# Patient Record
Sex: MALE | Race: WHITE | NOT HISPANIC OR LATINO | Employment: OTHER | URBAN - METROPOLITAN AREA
[De-identification: names, ages, dates, MRNs, and addresses within clinical notes are randomized per-mention and may not be internally consistent; named-entity substitution may affect disease eponyms.]

---

## 2017-03-18 ENCOUNTER — HOSPITAL ENCOUNTER (EMERGENCY)
Facility: CLINIC | Age: 62
Discharge: HOME | End: 2017-03-18
Attending: FAMILY MEDICINE | Admitting: FAMILY MEDICINE
Payer: COMMERCIAL

## 2017-03-18 ENCOUNTER — APPOINTMENT (OUTPATIENT)
Dept: GENERAL RADIOLOGY | Facility: CLINIC | Age: 62
End: 2017-03-18
Attending: FAMILY MEDICINE
Payer: COMMERCIAL

## 2017-03-18 VITALS
WEIGHT: 170.9 LBS | HEIGHT: 65 IN | DIASTOLIC BLOOD PRESSURE: 83 MMHG | OXYGEN SATURATION: 99 % | SYSTOLIC BLOOD PRESSURE: 139 MMHG | RESPIRATION RATE: 20 BRPM | BODY MASS INDEX: 28.47 KG/M2 | TEMPERATURE: 97.9 F

## 2017-03-18 DIAGNOSIS — R55 SYNCOPE, UNSPECIFIED SYNCOPE TYPE: ICD-10-CM

## 2017-03-18 DIAGNOSIS — E86.0 DEHYDRATION: ICD-10-CM

## 2017-03-18 LAB
ALBUMIN SERPL-MCNC: 3.5 G/DL (ref 3.4–5)
ALP SERPL-CCNC: 68 U/L (ref 40–150)
ALT SERPL W P-5'-P-CCNC: 28 U/L (ref 0–70)
ANION GAP SERPL CALCULATED.3IONS-SCNC: 10 MMOL/L (ref 3–14)
AST SERPL W P-5'-P-CCNC: 29 U/L (ref 0–45)
BASOPHILS # BLD AUTO: 0.1 10E9/L (ref 0–0.2)
BASOPHILS NFR BLD AUTO: 0.4 %
BILIRUB SERPL-MCNC: 0.5 MG/DL (ref 0.2–1.3)
BUN SERPL-MCNC: 12 MG/DL (ref 7–30)
CALCIUM SERPL-MCNC: 8.3 MG/DL (ref 8.5–10.1)
CHLORIDE SERPL-SCNC: 107 MMOL/L (ref 94–109)
CO2 SERPL-SCNC: 29 MMOL/L (ref 20–32)
CREAT SERPL-MCNC: 1.06 MG/DL (ref 0.66–1.25)
DIFFERENTIAL METHOD BLD: ABNORMAL
EOSINOPHIL # BLD AUTO: 0.2 10E9/L (ref 0–0.7)
EOSINOPHIL NFR BLD AUTO: 2.1 %
ERYTHROCYTE [DISTWIDTH] IN BLOOD BY AUTOMATED COUNT: 15.4 % (ref 10–15)
GFR SERPL CREATININE-BSD FRML MDRD: 70 ML/MIN/1.7M2
GLUCOSE SERPL-MCNC: 112 MG/DL (ref 70–99)
HCT VFR BLD AUTO: 44.8 % (ref 40–53)
HGB BLD-MCNC: 14.4 G/DL (ref 13.3–17.7)
IMM GRANULOCYTES # BLD: 0 10E9/L (ref 0–0.4)
IMM GRANULOCYTES NFR BLD: 0.1 %
LYMPHOCYTES # BLD AUTO: 1.9 10E9/L (ref 0.8–5.3)
LYMPHOCYTES NFR BLD AUTO: 16.9 %
MCH RBC QN AUTO: 29.9 PG (ref 26.5–33)
MCHC RBC AUTO-ENTMCNC: 32.1 G/DL (ref 31.5–36.5)
MCV RBC AUTO: 93 FL (ref 78–100)
MONOCYTES # BLD AUTO: 0.8 10E9/L (ref 0–1.3)
MONOCYTES NFR BLD AUTO: 7.2 %
NEUTROPHILS # BLD AUTO: 8.3 10E9/L (ref 1.6–8.3)
NEUTROPHILS NFR BLD AUTO: 73.3 %
PLATELET # BLD AUTO: 226 10E9/L (ref 150–450)
POTASSIUM SERPL-SCNC: 3.5 MMOL/L (ref 3.4–5.3)
PROT SERPL-MCNC: 6.9 G/DL (ref 6.8–8.8)
RBC # BLD AUTO: 4.81 10E12/L (ref 4.4–5.9)
SODIUM SERPL-SCNC: 146 MMOL/L (ref 133–144)
TROPONIN I SERPL-MCNC: NORMAL UG/L (ref 0–0.04)
WBC # BLD AUTO: 11.3 10E9/L (ref 4–11)

## 2017-03-18 PROCEDURE — 25000128 H RX IP 250 OP 636: Performed by: FAMILY MEDICINE

## 2017-03-18 PROCEDURE — 71020 XR CHEST 2 VW: CPT | Mod: TC

## 2017-03-18 PROCEDURE — 84484 ASSAY OF TROPONIN QUANT: CPT | Performed by: FAMILY MEDICINE

## 2017-03-18 PROCEDURE — 93005 ELECTROCARDIOGRAM TRACING: CPT

## 2017-03-18 PROCEDURE — 85025 COMPLETE CBC W/AUTO DIFF WBC: CPT | Performed by: FAMILY MEDICINE

## 2017-03-18 PROCEDURE — 99284 EMERGENCY DEPT VISIT MOD MDM: CPT | Mod: 25

## 2017-03-18 PROCEDURE — 99284 EMERGENCY DEPT VISIT MOD MDM: CPT | Mod: 25 | Performed by: FAMILY MEDICINE

## 2017-03-18 PROCEDURE — 93010 ELECTROCARDIOGRAM REPORT: CPT | Performed by: FAMILY MEDICINE

## 2017-03-18 PROCEDURE — 96361 HYDRATE IV INFUSION ADD-ON: CPT

## 2017-03-18 PROCEDURE — 96360 HYDRATION IV INFUSION INIT: CPT

## 2017-03-18 PROCEDURE — 80053 COMPREHEN METABOLIC PANEL: CPT | Performed by: FAMILY MEDICINE

## 2017-03-18 RX ORDER — ASPIRIN 325 MG
325 TABLET ORAL DAILY
COMMUNITY

## 2017-03-18 RX ORDER — SODIUM CHLORIDE 9 MG/ML
1000 INJECTION, SOLUTION INTRAVENOUS CONTINUOUS
Status: DISCONTINUED | OUTPATIENT
Start: 2017-03-18 | End: 2017-03-18 | Stop reason: HOSPADM

## 2017-03-18 RX ORDER — LIDOCAINE 40 MG/G
CREAM TOPICAL
Status: DISCONTINUED | OUTPATIENT
Start: 2017-03-18 | End: 2017-03-18 | Stop reason: HOSPADM

## 2017-03-18 RX ADMIN — SODIUM CHLORIDE 1000 ML: 9 INJECTION, SOLUTION INTRAVENOUS at 21:16

## 2017-03-18 RX ADMIN — SODIUM CHLORIDE 1000 ML: 9 INJECTION, SOLUTION INTRAVENOUS at 20:15

## 2017-03-18 ASSESSMENT — ENCOUNTER SYMPTOMS
SHORTNESS OF BREATH: 0
LIGHT-HEADEDNESS: 1
NAUSEA: 0
DIZZINESS: 0
CONSTIPATION: 0
DIARRHEA: 0
VOMITING: 0

## 2017-03-18 NOTE — ED AVS SNAPSHOT
[facility] Emergency Department    [address] [place] [address]    Phone:  [phone]    Fax:  [phone]                                       Wolf Tarango   MRN: [id number]    Department:  [facility] Emergency Department   Date of Visit:  3/18/2017           Patient Information     Date Of Birth          1955        Your diagnoses for this visit were:     Dehydration     Syncope, unspecified syncope type orthostatic, due to dehydration       You were seen by Tyrell Rubi MD.        Discharge Instructions       Drink plenty of fluids.  If you feel dizzy or lightheaded, sit down so you do not fall.  Recheck in clinic if persistent problems.  Return to the ED if worse/concerns.  It was nice visiting with all of you this evening.  I'm glad you are feeling better.    Thank you for choosing [facility]. We appreciate the opportunity to meet your urgent medical needs. Please let us know if we could have done anything to make your stay more satisfying.    After discharge, please closely monitor for any new or worsening symptoms. Return to the Emergency Department if you develop any acute worsening signs or symptoms.    If you had lab work, cultures or imaging studies done during your stay, the final results may still be pending. We will call you if your plan of care needs to change. However, if you are not improving as expected, please follow up with your primary care provider or clinic.     Start any prescription medications that were prescribed to you and take them as directed.     Please see additional handouts that may be pertinent to your condition.        Dehydration (Adult)  Dehydration occurs when your body loses too much fluid. This may be the result of prolonged vomiting or diarrhea, excessive sweating, or a high fever. It may also happen if you don t drink enough fluid when you re sick or out in the heat. Misuse of diuretics (water pills)  can also be a cause.  Symptoms include thirst and decreased urine output. You may also feel dizzy, weak, fatigued, or very drowsy. The diet described below is usually enough to treat dehydration. In some cases, you may need medicine.  Home care    Drink at least 12 8-ounce glasses of fluid every day to resolve the dehydration. Fluid may include water; orange juice; lemonade; apple, grape, or cranberry juice; clear fruit drinks; electrolyte replacement and sports drinks; and teas and coffee without caffeine. If you have been diagnosed with a kidney disease, ask your doctor how much and what types of fluids you should drink to prevent dehydration. If you have kidney disease, fluid can build up in the body. This can be dangerous to your health.     If you have a fever, muscle aches, or a headache as a result of a cold or flu, you may take acetaminophen or ibuprofen, unless another medicine was prescribed. If you have chronic liver or kidney disease, or have ever had a stomach ulcer or gastrointestinal bleeding, talk with your health care provider before using these medicines. Don't take aspirin if you are younger than 18 and have a fever. Aspirin raises the chance for severe liver injury.  Follow-up care  Follow up with your health care provider, or as advised.  When to seek medical advice  Call your health care provider right away if any of these occur:    Continued vomiting    Frequent diarrhea (more than 5 times a day); blood (red or black color) or mucus in diarrhea    Blood in vomit or stool    Swollen abdomen or increasing abdominal pain    Weakness, dizziness, or fainting    Unusual drowsiness or confusion    Reduced urine output or extreme thirst    Fever of 100.4 F (34 C) or higher     0661-1253 [employer]. [address] [place] [address], [place] [address]. All rights reserved. This information is not intended as a substitute for professional medical care. Always follow your healthcare professional's  "instructions.          24 Hour Appointment Hotline       To make an appointment at any [facility], call [phone] ([phone]). If you don't have a family doctor or clinic, we will help you find one. [facility] clinics are conveniently located to serve the needs of you and your family.             Review of your medicines      Our records show that you are taking the medicines listed below. If these are incorrect, please call your family doctor or clinic.        Dose / Directions Last dose taken    ADVIL PO        Refills:  0        aspirin 325 MG tablet   Dose:  325 mg        Take 325 mg by mouth daily   Refills:  0        NICORETTE 2 MG gum   Dose:  2 mg   Generic drug:  nicotine polacrilex        Place 2 mg inside cheek as needed for smoking cessation   Refills:  0                Procedures and tests performed during your visit     CBC with platelets differential    Cardiac Continuous Monitoring    Comprehensive metabolic panel    EKG 12-lead, tracing only    Peripheral IV: Standard    Pulse oximetry nursing    Troponin I    XR Chest 2 Views      Orders Needing Specimen Collection     None      Pending Results     No orders found from 3/16/2017 to 3/19/2017.            Pending Culture Results     No orders found from 3/16/2017 to 3/19/2017.            Thank you for choosing [facility]       Thank you for choosing [facility] for your care. Our goal is always to provide you with excellent care. Hearing back from our patients is one way we can continue to improve our services. Please take a few minutes to complete the written survey that you may receive in the mail after you visit with us. Thank you!        [employer] Information     [employer] lets you send messages to your doctor, view your test results, renew your prescriptions, schedule appointments and more. To sign up, go to [email or web address] . Click on \"Log in\" on the left side of the screen, which will take you to the Welcome page. Then click on " "\"Sign up Now\" on the right side of the page.     You will be asked to enter the access code listed below, as well as some personal information. Please follow the directions to create your username and password.     Your access code is: [email or web address]  Expires: 2017  9:39 PM     Your access code will  in 90 days. If you need help or a new code, please call your [place] clinic or [phone].        Care EveryWhere ID     This is your Care EveryWhere ID. This could be used by other organizations to access your [place] medical records  [id number]        After Visit Summary       This is your record. Keep this with you and show to your community pharmacist(s) and doctor(s) at your next visit.                  "

## 2017-03-18 NOTE — ED AVS SNAPSHOT
[facility] Emergency Department    [address] [place] [address]    Phone:  [phone]    Fax:  [phone]                                       Wolf Tarango   MRN: [id number]    Department:  [facility] Emergency Department   Date of Visit:  3/18/2017           After Visit Summary Signature Page     I have received my discharge instructions, and my questions have been answered. I have discussed any challenges I see with this plan with the nurse or doctor.    ..........................................................................................................................................  Patient/Patient Representative Signature      ..........................................................................................................................................  Patient Representative Print Name and Relationship to Patient    ..................................................               ................................................  Date                                            Time    ..........................................................................................................................................  Reviewed by Signature/Title    ...................................................              ..............................................  Date                                                            Time

## 2017-03-19 NOTE — DISCHARGE INSTRUCTIONS
Drink plenty of fluids.  If you feel dizzy or lightheaded, sit down so you do not fall.  Recheck in clinic if persistent problems.  Return to the ED if worse/concerns.  It was nice visiting with all of you this evening.  I'm glad you are feeling better.    Thank you for choosing [facility]. We appreciate the opportunity to meet your urgent medical needs. Please let us know if we could have done anything to make your stay more satisfying.    After discharge, please closely monitor for any new or worsening symptoms. Return to the Emergency Department if you develop any acute worsening signs or symptoms.    If you had lab work, cultures or imaging studies done during your stay, the final results may still be pending. We will call you if your plan of care needs to change. However, if you are not improving as expected, please follow up with your primary care provider or clinic.     Start any prescription medications that were prescribed to you and take them as directed.     Please see additional handouts that may be pertinent to your condition.        Dehydration (Adult)  Dehydration occurs when your body loses too much fluid. This may be the result of prolonged vomiting or diarrhea, excessive sweating, or a high fever. It may also happen if you don t drink enough fluid when you re sick or out in the heat. Misuse of diuretics (water pills) can also be a cause.  Symptoms include thirst and decreased urine output. You may also feel dizzy, weak, fatigued, or very drowsy. The diet described below is usually enough to treat dehydration. In some cases, you may need medicine.  Home care    Drink at least 12 8-ounce glasses of fluid every day to resolve the dehydration. Fluid may include water; orange juice; lemonade; apple, grape, or cranberry juice; clear fruit drinks; electrolyte replacement and sports drinks; and teas and coffee without caffeine. If you have been diagnosed with a kidney disease, ask your  doctor how much and what types of fluids you should drink to prevent dehydration. If you have kidney disease, fluid can build up in the body. This can be dangerous to your health.     If you have a fever, muscle aches, or a headache as a result of a cold or flu, you may take acetaminophen or ibuprofen, unless another medicine was prescribed. If you have chronic liver or kidney disease, or have ever had a stomach ulcer or gastrointestinal bleeding, talk with your health care provider before using these medicines. Don't take aspirin if you are younger than 18 and have a fever. Aspirin raises the chance for severe liver injury.  Follow-up care  Follow up with your health care provider, or as advised.  When to seek medical advice  Call your health care provider right away if any of these occur:    Continued vomiting    Frequent diarrhea (more than 5 times a day); blood (red or black color) or mucus in diarrhea    Blood in vomit or stool    Swollen abdomen or increasing abdominal pain    Weakness, dizziness, or fainting    Unusual drowsiness or confusion    Reduced urine output or extreme thirst    Fever of 100.4 F (34 C) or higher     4498-1421 [employer]. [address] [place] [address]. All rights reserved. This information is not intended as a substitute for professional medical care. Always follow your healthcare professional's instructions.

## 2017-03-19 NOTE — ED NOTES
Pt fell in kitchen tonight unwitnessed but heard by family in the other room. LOC for approx 1min or so

## 2017-03-19 NOTE — ED PROVIDER NOTES
History     Chief Complaint   Patient presents with     Loss of Consciousness     The history is provided by the patient and a relative.     Wolf Tarango is a 61 year old male who presents to the ED with his two boys for loss of consciousness. Patient was in the kitchen when his family heard a fall from the living room. Children report that he was LOC for approximately 1 minute. He reports that he did feel a little lightheaded before the incident. He then went and sat down in the chair and then got back up, and went to the kitchen and that's when he woke up to his son's trying to wake him up.  He was in the process of making spaghetti and fortunately did not make it to the stove to handle the boiling pot of water before he fell.    Denies chest heaviness, shortness of breath, nausea or a headache. His kids state that he didn't drink much water today and smoked some marijuana with them. There is a family history of heart problems.       There is no problem list on file for this patient.    History reviewed. No pertinent past medical history.    History reviewed. No pertinent past surgical history.    No family history on file.    Social History   Substance Use Topics     Smoking status: Former Smoker     Smokeless tobacco: Current User     Alcohol use No          There is no immunization history on file for this patient.     No Known Allergies    Current Outpatient Prescriptions   Medication Sig Dispense Refill     Ibuprofen (ADVIL PO)        aspirin 325 MG tablet Take 325 mg by mouth daily       nicotine polacrilex (NICORETTE) 2 MG gum Place 2 mg inside cheek as needed for smoking cessation           I have reviewed the Medications, Allergies, Past Medical and Surgical History, and Social History in the Epic system.    Review of Systems   Respiratory: Negative for shortness of breath.    Gastrointestinal: Negative for constipation, diarrhea, nausea and vomiting.   Skin: Negative for rash.   Neurological:  "Positive for light-headedness. Negative for dizziness.   All other systems reviewed and are negative.      Physical Exam   BP: 127/75  Heart Rate: 73  Temp: 97.9  F (36.6  C)  Resp: 20  Height: 165.1 cm (5' 5\")  Weight: 77.5 kg (170 lb 14.4 oz)  SpO2: 98 %  Physical Exam   Constitutional: He is oriented to person, place, and time. He appears well-developed and well-nourished. No distress.   HENT:   Head: Normocephalic and atraumatic.   Mouth/Throat: Oropharynx is clear and moist.   Eyes: EOM are normal. Pupils are equal, round, and reactive to light.   Neck: Neck supple.   Cardiovascular: Normal rate, regular rhythm and normal heart sounds.    Pulmonary/Chest: Effort normal and breath sounds normal. No respiratory distress.   Abdominal: Soft. Bowel sounds are normal. There is no tenderness.   Musculoskeletal: Normal range of motion. He exhibits no edema, tenderness or deformity.   Neurological: He is alert and oriented to person, place, and time. He has normal strength. No cranial nerve deficit or sensory deficit. He displays a negative Romberg sign. Coordination (FNF is normal) normal. GCS eye subscore is 4. GCS verbal subscore is 5. GCS motor subscore is 6.   Skin: Skin is warm and dry.   Psychiatric: He has a normal mood and affect.       ED Course    EKG was normal.  He maintained in a sinus rhythm on the cardiac monitor.  Labs are unremarkable.  Troponin undetectable.  No signs of trauma.  He's feeling much improved after a liter of normal saline.  He feels ready to go home.  Suspect he is a bit on the dry side because of inadequate oral intake.  He readily admits to not drinking or eating much today or even yesterday but was in the process of making spaghetti.  Did smoke some marijuana with his sons earlier today and has been using Nicorette gum while continuing to chew tobacco.           ED Course     Procedures        EKG: Sinus rhythm at 79 BPM.  No acute ischemic changes.  Normal EKG.        Results for " orders placed or performed during the hospital encounter of 03/18/17 (from the past 24 hour(s))   CBC with platelets differential   Result Value Ref Range    WBC 11.3 (H) 4.0 - 11.0 10e9/L    RBC Count 4.81 4.4 - 5.9 10e12/L    Hemoglobin 14.4 13.3 - 17.7 g/dL    Hematocrit 44.8 40.0 - 53.0 %    MCV 93 78 - 100 fl    MCH 29.9 26.5 - 33.0 pg    MCHC 32.1 31.5 - 36.5 g/dL    RDW 15.4 (H) 10.0 - 15.0 %    Platelet Count 226 150 - 450 10e9/L    Diff Method Automated Method     % Neutrophils 73.3 %    % Lymphocytes 16.9 %    % Monocytes 7.2 %    % Eosinophils 2.1 %    % Basophils 0.4 %    % Immature Granulocytes 0.1 %    Absolute Neutrophil 8.3 1.6 - 8.3 10e9/L    Absolute Lymphocytes 1.9 0.8 - 5.3 10e9/L    Absolute Monocytes 0.8 0.0 - 1.3 10e9/L    Absolute Eosinophils 0.2 0.0 - 0.7 10e9/L    Absolute Basophils 0.1 0.0 - 0.2 10e9/L    Abs Immature Granulocytes 0.0 0 - 0.4 10e9/L   Troponin I   Result Value Ref Range    Troponin I ES  0.000 - 0.045 ug/L     <0.015  The 99th percentile for upper reference range is 0.045 ug/L.  Troponin values in   the range of 0.045 - 0.120 ug/L may be associated with risks of adverse   clinical events.     Comprehensive metabolic panel   Result Value Ref Range    Sodium 146 (H) 133 - 144 mmol/L    Potassium 3.5 3.4 - 5.3 mmol/L    Chloride 107 94 - 109 mmol/L    Carbon Dioxide 29 20 - 32 mmol/L    Anion Gap 10 3 - 14 mmol/L    Glucose 112 (H) 70 - 99 mg/dL    Urea Nitrogen 12 7 - 30 mg/dL    Creatinine 1.06 0.66 - 1.25 mg/dL    GFR Estimate 70 >60 mL/min/1.7m2    GFR Estimate If Black 85 >60 mL/min/1.7m2    Calcium 8.3 (L) 8.5 - 10.1 mg/dL    Bilirubin Total 0.5 0.2 - 1.3 mg/dL    Albumin 3.5 3.4 - 5.0 g/dL    Protein Total 6.9 6.8 - 8.8 g/dL    Alkaline Phosphatase 68 40 - 150 U/L    ALT 28 0 - 70 U/L    AST 29 0 - 45 U/L   XR Chest 2 Views    Narrative    XR CHEST 2 VW   3/18/2017 7:51 PM     HISTORY: Chest Pain    COMPARISON: None.    FINDINGS: The heart is negative.  The lungs are  clear. The pulmonary  vasculature is normal.  The bones and soft tissues are unremarkable.      Impression    IMPRESSION: No active infiltrate.        KD BOWLES MD       Medications   lidocaine 1 % 1 mL (not administered)   lidocaine (LMX4) kit (not administered)   sodium chloride (PF) 0.9% PF flush 3 mL (not administered)   sodium chloride (PF) 0.9% PF flush 3 mL (not administered)   0.9% sodium chloride BOLUS (0 mLs Intravenous Stopped 3/18/17 2117)     Followed by   0.9% sodium chloride infusion (1,000 mLs Intravenous New Bag 3/18/17 2116)     Assessments & Plan (with Medical Decision Making)    (E86.0) Dehydration  Comment: Due to inadequate oral intake.  Improved after IV fluids  Plan: Continue oral hydration.    (R55) Syncope, unspecified syncope type  Comment: orthostatic, due to dehydration  Plan: Recheck if recurrent problems.  Maintain adequate hydration.        I have reviewed the nursing notes.    I have reviewed the findings, diagnosis, plan and need for follow up with the patient.    New Prescriptions    No medications on file       Final diagnoses:   Dehydration   Syncope, unspecified syncope type - orthostatic, due to dehydration     This document serves as a record of services personally performed by Tyrell Rubi MD. It was created on their behalf by Odette Marcelo, a trained medical scribe. The creation of this record is based on the provider's personal observations and the statements of the patient. This document has been checked and approved by the attending provider.    Note: Chart documentation done in part with Dragon Voice Recognition software. Although reviewed after completion, some word and grammatical errors may remain.      3/18/2017   [facility] EMERGENCY DEPARTMENT     Tyrell Rubi MD  03/18/17 [id number]

## 2022-04-01 ENCOUNTER — TRANSCRIBE ORDERS (OUTPATIENT)
Dept: OTHER | Age: 67
End: 2022-04-01

## 2022-04-05 ENCOUNTER — TRANSCRIBE ORDERS (OUTPATIENT)
Dept: OTHER | Age: 67
End: 2022-04-05

## 2022-04-05 DIAGNOSIS — R19.5 OTHER FECAL ABNORMALITIES: Primary | ICD-10-CM

## 2022-04-15 ENCOUNTER — TELEPHONE (OUTPATIENT)
Dept: GASTROENTEROLOGY | Facility: CLINIC | Age: 67
End: 2022-04-15
Payer: MEDICARE

## 2022-04-15 NOTE — TELEPHONE ENCOUNTER
Screening Questions  BlueKIND OF PREP RedLOCATION [review exclusion criteria] GreenSEDATION TYPE  1. Have you had a positive covid test in the last 90 days? N     2. Do you have a legal guardian or medical Power of ?  Are you able to give consent for your medical care?Y (Sedation review/consideration needed)    3. Are you active on mychart? N    4. What insurance is in the chart? [facility] [employer]/medicare      3.   Ordering/Referring Provider: [facility] Antoni Ritter John, PA-C in GENERIC EXTERNAL DATA DEPARTMENT    4. BMI 28.3 [BMI OVER 40-EXTENDED PREP]  If greater than 40 review exclusion criteria [PAC APPT IF @ [facility]]        5.  Respiratory Screening :  [If yes to any of the following HOSPITAL setting only]     Do you use daily home oxygen? N    Do you have mod to severe Obstructive Sleep Apnea? N  [OKAY @ [facility] SH PH RI]   Do you have Pulmonary Hypertension? N     Do you have UNCONTROLLED asthma? N        6.   Have you had a heart or lung transplant? N      7.   Are you currently on dialysis? N [ If yes, G-PREP & HOSPITAL setting only]     8.   Do you have chronic kidney disease? N [ If yes, G-PREP ]    9.   Have you had a stroke or Transient ischemic attack (TIA - aka  mini stroke ) within 6 months?  N (If yes, please review exclusion criteria)    10.   In the past 6 months, have you had any heart related issues including cardiomyopathy or heart attack? N           If yes, did it require cardiac stenting or other implantable device? N      11.   Do you have any implantable devices in your body (pacemaker, defib, LVAD)? N (If yes, please review exclusion criteria)    12.   Do you take nitroglycerin? N           If yes, how often? N  (if yes, HOSPITAL setting ONLY)    13.   Are you currently taking any blood thinners? N           [IF YES, INFORM PATIENT TO FOLLOW UP W/ ORDERING PROVIDER FOR BRIDGING INSTRUCTIONS]     14.   Do you have a diagnosis of diabetes? N   [ If yes, G-PREP ]    15.   [FEMALES]  Are you currently pregnant?     If yes, how many weeks?     16.   Are you taking any prescription pain medications on a routine schedule?  N  [ If yes, EXTENDED PREP.] [If yes, MAC]    17.   Do you have any chemical dependencies such as alcohol, street drugs, or methadone?  N [If yes, MAC]    18.   Do you have any history of post-traumatic stress syndrome, severe anxiety or history of psychosis?  N  [If yes, MAC]    19.   Do you transfer independently?  Y    20.  On a regular basis do you go 3-5 days between bowel movements? N   [ If yes, EXTENDED PREP.]    21.   Preferred LOCAL Pharmacy for Pre Prescription   [facility]      Scheduling Details      Caller : Wolf  (Please ask for phone number if not scheduled by patient)    Type of Procedure Scheduled: colonoscopy  Which Colonoscopy Prep was Sent?: miralax  KHORUTS CF PATIENTS & GROEN'S PATIENTS NEEDS EXTENDED PREP  Surgeon: osmany  Date of Procedure: 5/24  Location:       Sedation Type: CS  Conscious Sedation- Needs  for 6 hours after the procedure  MAC/General-Needs  for 24 hours after procedure    Pre-op Required at [facility], [place], [place] and OR for MAC sedation: n  (advise patient they will need a pre-op prior to procedure -)      Informed patient they will need an adult  y  Cannot take any type of public or medical transportation alone    Pre-Procedure [facility]id test to [facility] or Externally:     Confirmed Nurse will call to complete assessment y    Additional comments: none

## 2022-04-19 DIAGNOSIS — Z11.59 ENCOUNTER FOR SCREENING FOR OTHER VIRAL DISEASES: Primary | ICD-10-CM

## 2022-05-21 ENCOUNTER — LAB (OUTPATIENT)
Dept: LAB | Facility: CLINIC | Age: 67
End: 2022-05-21
Payer: MEDICARE

## 2022-05-21 DIAGNOSIS — Z11.59 ENCOUNTER FOR SCREENING FOR OTHER VIRAL DISEASES: ICD-10-CM

## 2022-05-21 PROCEDURE — U0003 INFECTIOUS AGENT DETECTION BY NUCLEIC ACID (DNA OR RNA); SEVERE ACUTE RESPIRATORY SYNDROME CORONAVIRUS 2 (SARS-COV-2) (CORONAVIRUS DISEASE [COVID-19]), AMPLIFIED PROBE TECHNIQUE, MAKING USE OF HIGH THROUGHPUT TECHNOLOGIES AS DESCRIBED BY CMS-2020-01-R: HCPCS

## 2022-05-21 PROCEDURE — U0005 INFEC AGEN DETEC AMPLI PROBE: HCPCS

## 2022-05-22 LAB — SARS-COV-2 RNA RESP QL NAA+PROBE: NEGATIVE

## 2022-05-23 ENCOUNTER — ANESTHESIA EVENT (OUTPATIENT)
Dept: GASTROENTEROLOGY | Facility: CLINIC | Age: 67
End: 2022-05-23
Payer: COMMERCIAL

## 2022-05-23 NOTE — H&P
[facility] History and Physical    Wolf Tarango MRN# [id number]   Age: 67 year old YOB: 1955     Date of Admission:  (Not on file)    [facility]  Primary care provider: [facility]          Impression and Plan:   Impression:   Other fecal abnormalities [R19.5]  Abnormal FIT  No prior colonoscopy at all.  No prior colonoscopy in system      Plan:   Proceed to Colonoscopy as planned.  The procedure, risks(bleeding, perforation), benefits and alternatives were discussed and the patient agrees to proceed. Cleared for Anesthesia             Chief Complaint:   Other fecal abnormalities [R19.5]    History is obtained from the patient          History of Present Illness:   This 67 year old male is being seen at this time for evaluation for colonoscopy.  No complaints or family history.  ? Abnormal FIT.           Past Medical History:   No past medical history on file.         Past Surgical History:   No past surgical history on file.         Social History:     Social History     Tobacco Use     Smoking status: Former Smoker     Smokeless tobacco: Current User   Substance Use Topics     Alcohol use: No            Family History:   No family history on file.         Immunizations:     VACCINE/DOSE   Diptheria   DPT   DTAP   HBIG   Hepatitis A   Hepatitis B   HIB   Influenza   Measles   Meningococcal   MMR   Mumps   Pneumococcal   Polio   Rubella   Small Pox   TDAP   Varicella   Zoster            Allergies:   No Known Allergies         Medications:     No current facility-administered medications for this encounter.     Current Outpatient Medications   Medication Sig     aspirin 325 MG tablet Take 325 mg by mouth daily     Ibuprofen (ADVIL PO)      nicotine polacrilex (NICORETTE) 2 MG gum Place 2 mg inside cheek as needed for smoking cessation             Review of Systems:   The review of systems was positive for the following findings.  None.  The remainder of the review of  systems was unremarkable.          Physical Exam:   All vitals have been reviewed  There were no vitals taken for this visit.  No intake or output data in the 24 hours ending 05/23/22 0958  SHEENT examination revealed NC/aT, EOMI.  Examination of the chest revealed CTA.  Examination of the heart revealed RRR.  Examination of the abdomen revealed soft, non tender.  The neuromuscular examination was NL.          Data:   All laboratory data reviewed  No results found for any visits on 05/24/22.       Wolf Schrader MD, FACS

## 2022-05-24 ENCOUNTER — HOSPITAL ENCOUNTER (OUTPATIENT)
Facility: CLINIC | Age: 67
Discharge: HOME | End: 2022-05-24
Attending: SPECIALIST | Admitting: SPECIALIST
Payer: COMMERCIAL

## 2022-05-24 ENCOUNTER — ANESTHESIA (OUTPATIENT)
Dept: GASTROENTEROLOGY | Facility: CLINIC | Age: 67
End: 2022-05-24
Payer: COMMERCIAL

## 2022-05-24 VITALS
DIASTOLIC BLOOD PRESSURE: 93 MMHG | SYSTOLIC BLOOD PRESSURE: 141 MMHG | HEART RATE: 74 BPM | OXYGEN SATURATION: 97 % | RESPIRATION RATE: 16 BRPM

## 2022-05-24 LAB — COLONOSCOPY: NORMAL

## 2022-05-24 PROCEDURE — 370N000017 HC ANESTHESIA TECHNICAL FEE, PER MIN: Performed by: SPECIALIST

## 2022-05-24 PROCEDURE — 258N000003 HC RX IP 258 OP 636: Performed by: NURSE ANESTHETIST, CERTIFIED REGISTERED

## 2022-05-24 PROCEDURE — 45378 DIAGNOSTIC COLONOSCOPY: CPT | Performed by: SPECIALIST

## 2022-05-24 PROCEDURE — 250N000011 HC RX IP 250 OP 636: Performed by: NURSE ANESTHETIST, CERTIFIED REGISTERED

## 2022-05-24 PROCEDURE — 250N000009 HC RX 250: Performed by: NURSE ANESTHETIST, CERTIFIED REGISTERED

## 2022-05-24 RX ORDER — LIDOCAINE 40 MG/G
CREAM TOPICAL
Status: DISCONTINUED | OUTPATIENT
Start: 2022-05-24 | End: 2022-05-24 | Stop reason: HOSPADM

## 2022-05-24 RX ORDER — PROPOFOL 10 MG/ML
INJECTION, EMULSION INTRAVENOUS PRN
Status: DISCONTINUED | OUTPATIENT
Start: 2022-05-24 | End: 2022-05-24

## 2022-05-24 RX ORDER — LIDOCAINE HYDROCHLORIDE 20 MG/ML
INJECTION, SOLUTION INFILTRATION; PERINEURAL PRN
Status: DISCONTINUED | OUTPATIENT
Start: 2022-05-24 | End: 2022-05-24

## 2022-05-24 RX ORDER — SODIUM CHLORIDE, SODIUM LACTATE, POTASSIUM CHLORIDE, CALCIUM CHLORIDE 600; 310; 30; 20 MG/100ML; MG/100ML; MG/100ML; MG/100ML
INJECTION, SOLUTION INTRAVENOUS CONTINUOUS
Status: DISCONTINUED | OUTPATIENT
Start: 2022-05-24 | End: 2022-05-24 | Stop reason: HOSPADM

## 2022-05-24 RX ORDER — PROPOFOL 10 MG/ML
INJECTION, EMULSION INTRAVENOUS CONTINUOUS PRN
Status: DISCONTINUED | OUTPATIENT
Start: 2022-05-24 | End: 2022-05-24

## 2022-05-24 RX ADMIN — PROPOFOL 150 MCG/KG/MIN: 10 INJECTION, EMULSION INTRAVENOUS at 15:51

## 2022-05-24 RX ADMIN — SODIUM CHLORIDE, POTASSIUM CHLORIDE, SODIUM LACTATE AND CALCIUM CHLORIDE: 600; 310; 30; 20 INJECTION, SOLUTION INTRAVENOUS at 13:50

## 2022-05-24 RX ADMIN — LIDOCAINE HYDROCHLORIDE 40 MG: 20 INJECTION, SOLUTION INFILTRATION; PERINEURAL at 15:50

## 2022-05-24 RX ADMIN — PROPOFOL 30 MG: 10 INJECTION, EMULSION INTRAVENOUS at 15:52

## 2022-05-24 RX ADMIN — PROPOFOL 50 MG: 10 INJECTION, EMULSION INTRAVENOUS at 15:50

## 2022-05-24 ASSESSMENT — LIFESTYLE VARIABLES: TOBACCO_USE: 1

## 2022-05-24 NOTE — ANESTHESIA PREPROCEDURE EVALUATION
Anesthesia Pre-Procedure Evaluation    Patient: Wolf Tarango   MRN: [id number] : 1955        Procedure : Procedure(s):  COLONOSCOPY          History reviewed. No pertinent past medical history.   History reviewed. No pertinent surgical history.   No Known Allergies   Social History     Tobacco Use     Smoking status: Former Smoker     Smokeless tobacco: Current User   Substance Use Topics     Alcohol use: No      Wt Readings from Last 1 Encounters:   17 77.5 kg (170 lb 14.4 oz)        Anesthesia Evaluation   Pt has had prior anesthetic. Type: MAC and General.    No history of anesthetic complications       ROS/MED HX  ENT/Pulmonary:     (+) tobacco use, Past use,     Neurologic:  - neg neurologic ROS     Cardiovascular:  - neg cardiovascular ROS   (+) -----Previous cardiac testing   Echo: Date: Results:    Stress Test: Date: Results:    ECG Reviewed: Date:  Results:  SR  Cath: Date: Results:      METS/Exercise Tolerance: >4 METS    Hematologic:  - neg hematologic  ROS     Musculoskeletal:  - neg musculoskeletal ROS     GI/Hepatic:  - neg GI/hepatic ROS     Renal/Genitourinary:  - neg Renal ROS     Endo:  - neg endo ROS     Psychiatric/Substance Use:     (+) Recreational drug usage: Cannabis.    Infectious Disease:  - neg infectious disease ROS     Malignancy:  - neg malignancy ROS     Other:  - neg other ROS          Physical Exam    Airway        Mallampati: III   TM distance: > 3 FB   Neck ROM: full   Mouth opening: > 3 cm    Respiratory Devices and Support         Dental       (+) missing      Cardiovascular   cardiovascular exam normal       Rhythm and rate: regular and normal     Pulmonary   pulmonary exam normal        breath sounds clear to auscultation           OUTSIDE LABS:  CBC:   Lab Results   Component Value Date    WBC 11.3 (H) 2017    HGB 14.4 2017    HCT 44.8 2017     2017     BMP:   Lab Results   Component Value Date     (H) 2017     POTASSIUM 3.5 03/18/2017    CHLORIDE 107 03/18/2017    CO2 29 03/18/2017    BUN 12 03/18/2017    CR 1.06 03/18/2017     (H) 03/18/2017     COAGS: No results found for: PTT, INR, FIBR  POC: No results found for: BGM, HCG, HCGS  HEPATIC:   Lab Results   Component Value Date    ALBUMIN 3.5 03/18/2017    PROTTOTAL 6.9 03/18/2017    ALT 28 03/18/2017    AST 29 03/18/2017    ALKPHOS 68 03/18/2017    BILITOTAL 0.5 03/18/2017     OTHER:   Lab Results   Component Value Date    PRAMOD 8.3 (L) 03/18/2017       Anesthesia Plan    ASA Status:  2   NPO Status:  NPO Appropriate    Anesthesia Type: MAC.     - Reason for MAC: Deep or markedly invasive procedure (G8)   Induction: Intravenous.   Maintenance: TIVA.        Consents    Anesthesia Plan(s) and associated risks, benefits, and realistic alternatives discussed. Questions answered and patient/representative(s) expressed understanding.    - Discussed:     - Discussed with:  Patient      - Extended Intubation/Ventilatory Support Discussed: No.      - Patient is DNR/DNI Status: No    Use of blood products discussed: Yes.     - Discussed with: Patient.     - Consented: consented to blood products            Reason for refusal: other.     Postoperative Care    Pain management: Multi-modal analgesia.   PONV prophylaxis: Background Propofol Infusion     Comments:    Other Comments: The risks and benefits of anesthesia, and the alternatives where applicable, have been discussed with the patient, and they wish to proceed.            CHRISTY Cameron CRNA

## 2022-05-24 NOTE — ANESTHESIA POSTPROCEDURE EVALUATION
Patient: Wolf Tarango    Procedure: Procedure(s):  COLONOSCOPY       Anesthesia Type:  MAC    Note:  Disposition: Outpatient   Postop Pain Control: Uneventful            Sign Out: Well controlled pain   PONV: No   Neuro/Psych: Uneventful            Sign Out: Acceptable/Baseline neuro status   Airway/Respiratory: Uneventful            Sign Out: Acceptable/Baseline resp. status   CV/Hemodynamics: Uneventful            Sign Out: Acceptable CV status   Other NRE: NONE   DID A NON-ROUTINE EVENT OCCUR? No    Event details/Postop Comments:  Pt was happy with anesthesia care.  No complications.  I will follow up with the pt if needed.           Last vitals:  Vitals Value Taken Time   /84 05/24/22 1610   Temp     Pulse 83 05/24/22 1610   Resp     SpO2 97 % 05/24/22 1617   Vitals shown include unvalidated device data.    Electronically Signed By: CHRISTY Vleazco CRNA  May 24, 2022  4:18 PM

## 2022-05-24 NOTE — DISCHARGE INSTRUCTIONS
[facility]    Home Care Following Endoscopy          Activity:  You have just undergone an endoscopic procedure usually performed with conscious sedation.  Do not work or operate machinery (including a car) for at least 12 hours.    I encourage you to walk and attempt to pass this air as soon as possible.    Diet:  Return to the diet you were on before your procedure but eat lightly for the first 12-24 hours.  Drink plenty of water.  Resume any regular medications unless otherwise advised by your physician.  Please begin any new medication prescribed as a result of your procedure as directed by your physician.   If you had any biopsy or polyp removed please refrain from aspirin or aspirin products for 2 days.  If on Coumadin please restart as instructed by your physician.   Pain:  You may take Tylenol as needed for pain.  Expected Recovery:  You can expect some mild abdominal fullness and/or discomfort due to the air used to inflate your intestinal tract.     Call Your Physician if You Have:  After Colonoscopy:  Worsening persisting abdominal pain which is worse with activity.  Fevers (>101 degrees F), chills or shakes.  Passage of continued blood with bowel movements.   Any questions or concerns about your recovery, please call [phone] or after hours [phone] Nurse Advice Line.    Follow-up Care:  You did have polyps/biopsy tissue sample(s) removed.  The polyps/biopsy tissue sample(s) will be sent to pathology.  You should receive letter in your My Chart from Dr. Schrader with your results within 1-2 weeks. If you do not participate in My Chart a physical letter will come in the mail in 2-3 weeks.  Please call if you have not received a notification of your results.  If asked to return to clinic please make an appointment 1 week after your procedure.  Call [phone].

## 2022-05-24 NOTE — ANESTHESIA CARE TRANSFER NOTE
Patient: Wolf Tarango    Procedure: Procedure(s):  COLONOSCOPY       Diagnosis: Other fecal abnormalities [R19.5]  Diagnosis Additional Information: No value filed.    Anesthesia Type:   MAC     Note:    Oropharynx: oropharynx clear of all foreign objects and spontaneously breathing  Level of Consciousness: drowsy  Oxygen Supplementation: room air    Independent Airway: airway patency satisfactory and stable  Dentition: dentition unchanged  Vital Signs Stable: post-procedure vital signs reviewed and stable  Report to RN Given: handoff report given  Patient transferred to: Phase II    Handoff Report: Identifed the Patient, Identified the Reponsible Provider, Reviewed the pertinent medical history, Discussed the surgical course, Reviewed Intra-OP anesthesia mangement and issues during anesthesia, Set expectations for post-procedure period and Allowed opportunity for questions and acknowledgement of understanding      Vitals:  Vitals Value Taken Time   /84 05/24/22 1610   Temp     Pulse 83 05/24/22 1610   Resp     SpO2 97 % 05/24/22 1616   Vitals shown include unvalidated device data.    Electronically Signed By: CHRISTY Velazco CRNA  May 24, 2022  4:17 PM

## 2022-06-23 ENCOUNTER — HOSPITAL ENCOUNTER (EMERGENCY)
Facility: CLINIC | Age: 67
Discharge: HOME | End: 2022-06-23
Attending: FAMILY MEDICINE | Admitting: FAMILY MEDICINE
Payer: COMMERCIAL

## 2022-06-23 VITALS
BODY MASS INDEX: 28.29 KG/M2 | DIASTOLIC BLOOD PRESSURE: 81 MMHG | TEMPERATURE: 98 F | OXYGEN SATURATION: 95 % | HEART RATE: 88 BPM | RESPIRATION RATE: 18 BRPM | WEIGHT: 170 LBS | SYSTOLIC BLOOD PRESSURE: 151 MMHG

## 2022-06-23 DIAGNOSIS — R33.9 URINARY RETENTION: ICD-10-CM

## 2022-06-23 LAB
ALBUMIN UR-MCNC: NEGATIVE MG/DL
APPEARANCE UR: CLEAR
BILIRUB UR QL STRIP: NEGATIVE
COLOR UR AUTO: YELLOW
GLUCOSE UR STRIP-MCNC: NEGATIVE MG/DL
HGB UR QL STRIP: ABNORMAL
KETONES UR STRIP-MCNC: NEGATIVE MG/DL
LEUKOCYTE ESTERASE UR QL STRIP: NEGATIVE
MUCOUS THREADS #/AREA URNS LPF: PRESENT /LPF
NITRATE UR QL: NEGATIVE
PH UR STRIP: 7 [PH] (ref 5–7)
RBC URINE: 12 /HPF
SP GR UR STRIP: 1.01 (ref 1–1.03)
UROBILINOGEN UR STRIP-MCNC: NORMAL MG/DL
WBC URINE: 1 /HPF

## 2022-06-23 PROCEDURE — 81001 URINALYSIS AUTO W/SCOPE: CPT | Performed by: FAMILY MEDICINE

## 2022-06-23 PROCEDURE — 51798 US URINE CAPACITY MEASURE: CPT | Performed by: FAMILY MEDICINE

## 2022-06-23 PROCEDURE — 99282 EMERGENCY DEPT VISIT SF MDM: CPT | Performed by: FAMILY MEDICINE

## 2022-06-23 PROCEDURE — 250N000009 HC RX 250: Performed by: FAMILY MEDICINE

## 2022-06-23 PROCEDURE — 99284 EMERGENCY DEPT VISIT MOD MDM: CPT | Mod: 25 | Performed by: FAMILY MEDICINE

## 2022-06-23 PROCEDURE — 51702 INSERT TEMP BLADDER CATH: CPT | Performed by: FAMILY MEDICINE

## 2022-06-23 RX ORDER — LIDOCAINE HYDROCHLORIDE 20 MG/ML
10 JELLY TOPICAL ONCE
Status: COMPLETED | OUTPATIENT
Start: 2022-06-23 | End: 2022-06-23

## 2022-06-23 RX ADMIN — LIDOCAINE HYDROCHLORIDE 10 ML: 20 JELLY TOPICAL at 19:40

## 2022-06-24 NOTE — ED PROVIDER NOTES
[facility] ED Provider Note   Patient: Wolf Tarango  MRN #:  [id number]  Date of Visit: June 23, 2022    CC:     Chief Complaint   Patient presents with     Unable to Void     HPI:  Wolf Tarango is a 67 year old male who presented to the emergency department with acute urinary retention, that started as the day went on today.  Patient has never had this problem before.  He is a remarkably healthy, and is only on aspirin and a One-A-Day vitamin.  [facility] and [facility] to make an appointment, they told him he should come in tonight.  Patient was outside today working after a tree had fallen on his house.  The tree was removed earlier today.  He has been trying to urinate, but is only getting slight dribble and only with effort.  He has not had a bowel movement today.  He has no history of BPH and states he usually has a normal urinary stream.  Pain is minimal.    Problem List:  There are no problems to display for this patient.      History reviewed. No pertinent past medical history.    MEDS: aspirin 325 MG tablet  Ibuprofen (ADVIL PO)  nicotine polacrilex (NICORETTE) 2 MG gum        ALLERGIES:  No Known Allergies    Past Surgical History:   Procedure Laterality Date     COLONOSCOPY N/A 5/24/2022    Procedure: COLONOSCOPY;  Surgeon: Wolf Schrader MD;  Location:  GI       Social History     Tobacco Use     Smoking status: Former Smoker     Smokeless tobacco: Current User   Substance Use Topics     Alcohol use: No     Drug use: Yes     Frequency: 0.5 times per week     Types: Marijuana         Review of Systems   Except as noted in HPI, all other systems were reviewed and are negative    Physical Exam     Vitals were reviewed  Patient Vitals for the past 12 hrs:   BP Temp Temp src Pulse Resp SpO2 Weight   06/23/22 2038 -- -- -- -- -- 95 % --   06/23/22 2037 (!) 151/81 -- -- 88 -- -- --   06/23/22 1925 (!)  211/115 -- -- -- 18 -- --   06/23/22 1922 -- 98  F (36.7  C) Oral 106 -- 94 % 77.1 kg (170 lb)     GENERAL APPEARANCE: Alert and oriented x3, no apparent distress  FACE: normal facies  EYES: Pupils are equal  HENT: normal external exam  NECK: no adenopathy or asymmetry  RESP: normal respiratory effort; clear breath sounds bilaterally  CV: regular rate and rhythm; no significant murmurs, gallops or rubs  ABD: soft, full lower abdominal sensation, no tenderness; no rebound or guarding; bowel sounds are normal  MS: no gross deformities noted; normal muscle tone.  EXT: No calf tenderness or pitting edema  SKIN: no worrisome rash  NEURO: no facial droop; no focal deficits, speech is normal  PSYCH: normal mood and affect      Available Lab/Imaging Results     Results for orders placed or performed during the hospital encounter of 06/23/22 (from the past 24 hour(s))   UA with Microscopic reflex to Culture    Specimen: Urine, Sorenson Catheter   Result Value Ref Range    Color Urine Yellow Colorless, Straw, Light Yellow, Yellow    Appearance Urine Clear Clear    Glucose Urine Negative Negative mg/dL    Bilirubin Urine Negative Negative    Ketones Urine Negative Negative mg/dL    Specific Gravity Urine 1.010 1.003 - 1.035    Blood Urine Small (A) Negative    pH Urine 7.0 5.0 - 7.0    Protein Albumin Urine Negative Negative mg/dL    Urobilinogen Urine Normal Normal, 2.0 mg/dL    Nitrite Urine Negative Negative    Leukocyte Esterase Urine Negative Negative    Mucus Urine Present (A) None Seen /LPF    RBC Urine 12 (H) <=2 /HPF    WBC Urine 1 <=5 /HPF    Narrative    Urine Culture not indicated                Impression     Final diagnoses:   Urinary retention         ED Course & Medical Decision Making   Wolf Tarango is a 67 year old male who presented to the emergency department with inability to void since this morning.  Patient was outside in the heat for several hours, came inside and started to pound some water.  He has  not been able to urinate but only has slight dribbling but only with extreme effort.  He does not have any pain except for the discomfort of not being able to void.  He has had a history of slight elevation of his PSA, and 3 years ago had a scope to look for cancer.  He has not had any problems with poor urinary stream.  Bladder scan reveals retained urine.  Urinalysis reveals 12 red blood cells and 1 white blood cell.  Sorenson catheter was inserted.  Patient feels better, and will follow-up at his clinic in 3-5 days.  Return to the ED with new or worsening symptoms.        Written after-visit summary and instructions were given at the time of discharge.      Discharge Instructions:   You had acute urinary retention, and a Sorenson catheter was placed.  Please see the attached handouts on Sorenson catheter care.  There is no signs of an infection  [facility] [place] [facility]  Return to the emergency department at any time if develop new or worsening symptoms.  Continue to monitor your blood pressure as it was elevated today.       Disclaimer: This note consists of words and symbols derived from keyboarding and dictation using voice recognition software.  As a result, there may be errors that have gone undetected.  Please consider this when interpreting information found in this note.       Tiana Woods MD  06/23/22 2053

## 2022-06-24 NOTE — DISCHARGE INSTRUCTIONS
You had acute urinary retention, and a Sorenson catheter was placed.  Please see the attached handouts on Sorenson catheter care.  There is no signs of an infection  [facility] [place] [facility].  Return to the emergency department at any time if develop new or worsening symptoms.  Continue to monitor your blood pressure as it was elevated today.

## 2022-06-27 ENCOUNTER — TELEPHONE (OUTPATIENT)
Dept: UROLOGY | Facility: CLINIC | Age: 67
End: 2022-06-27

## 2022-06-27 NOTE — TELEPHONE ENCOUNTER
Reason for Call:  Other call back and urgent referral    Detailed comments: patient was seen in the emergency room on 6/23/22.  [facility] stating [facility] will be [facility] an urgent request for patient to be seen by urology within the week.  Please call patient to work in as notes from emergency room are in Epic.  Thanks    Phone Number Patient can be reached at: Home number on file [phone] (home) or Cell number on file:    No relevant phone numbers on file.       Best Time: any    Can we leave a detailed message on this number? YES    Call taken on 6/27/2022 at 10:45 AM by Ana M Mac

## 2022-06-28 NOTE — TELEPHONE ENCOUNTER
[facility] Call Center    Phone Message    May a detailed message be left on voicemail: yes     Reason for Call: Other: Pt called in regarding [facility].   Pt has updated his phone to [phone]. The # that was given on previous message was incorrect.  Please give pt a call for scheduling    Action Taken: Other: URO    Travel Screening: Not Applicable

## 2022-06-29 NOTE — TELEPHONE ENCOUNTER
Writer called and spoke with patient. Patient states that [facility] however they referred him back to [facility] for urology. Patient states he had about 900 out the first time they emptied his catheter and then an hour later he had about 200 cc out.  An appointment was made for 7/6/2022 for trial of void and to establish care with Dr. Miller.    Helena VIVAS RN Urology 6/29/2022 10:12 AM

## 2022-07-06 ENCOUNTER — OFFICE VISIT (OUTPATIENT)
Dept: UROLOGY | Facility: CLINIC | Age: 67
End: 2022-07-06
Payer: COMMERCIAL

## 2022-07-06 VITALS
WEIGHT: 170 LBS | HEART RATE: 85 BPM | OXYGEN SATURATION: 96 % | TEMPERATURE: 97.9 F | SYSTOLIC BLOOD PRESSURE: 164 MMHG | DIASTOLIC BLOOD PRESSURE: 91 MMHG | RESPIRATION RATE: 16 BRPM | BODY MASS INDEX: 28.29 KG/M2

## 2022-07-06 DIAGNOSIS — N40.1 BENIGN PROSTATIC HYPERPLASIA WITH URINARY RETENTION: Primary | ICD-10-CM

## 2022-07-06 DIAGNOSIS — R97.20 ELEVATED PROSTATE SPECIFIC ANTIGEN (PSA): ICD-10-CM

## 2022-07-06 DIAGNOSIS — R33.8 BENIGN PROSTATIC HYPERPLASIA WITH URINARY RETENTION: Primary | ICD-10-CM

## 2022-07-06 PROCEDURE — 51700 IRRIGATION OF BLADDER: CPT | Performed by: UROLOGY

## 2022-07-06 PROCEDURE — 99204 OFFICE O/P NEW MOD 45 MIN: CPT | Mod: 25 | Performed by: UROLOGY

## 2022-07-06 RX ORDER — FINASTERIDE 5 MG/1
5 TABLET, FILM COATED ORAL DAILY
Qty: 90 TABLET | Refills: 3 | Status: SHIPPED | OUTPATIENT
Start: 2022-07-06 | End: 2023-07-12

## 2022-07-06 RX ORDER — TAMSULOSIN HYDROCHLORIDE 0.4 MG/1
0.4 CAPSULE ORAL EVERY EVENING
Qty: 90 CAPSULE | Refills: 3 | Status: SHIPPED | OUTPATIENT
Start: 2022-07-06 | End: 2023-07-12

## 2022-07-06 ASSESSMENT — PAIN SCALES - GENERAL: PAINLEVEL: NO PAIN (0)

## 2022-07-06 NOTE — PROGRESS NOTES
Patient arrives for catheter removal. 180 cc water instilled into the bladder through existing sanders catheter. Catheter removed without problem. Patient urinated 50cc water into urinal. Patient will call or return to clinic or urgent care if unable to void.    Patient instructed on self cath. Patient will call with any questions or concerns.    Starla Alfonso RN on 7/6/2022 at 12:21 PM

## 2022-07-06 NOTE — PROGRESS NOTES
S: Wolf Tarango Jr is a pleasant  67 year old male who was requested to be seen by  [facility] for a consult with regard to patient's urinary complaints.  Patient complains of retention of urine recently and had catheter placed.  He denies any LUTS prior.  He has history of elevated PSA. [facility].  No cancer was found.  Psa was in low 4's.      Current Outpatient Medications   Medication Sig Dispense Refill     aspirin 325 MG tablet Take 325 mg by mouth daily       Ibuprofen (ADVIL PO)        nicotine polacrilex (NICORETTE) 2 MG gum Place 2 mg inside cheek as needed for smoking cessation       No Known Allergies  No past medical history on file.  Past Surgical History:   Procedure Laterality Date     COLONOSCOPY N/A 5/24/2022    Procedure: COLONOSCOPY;  Surgeon: Wolf Schrader MD;  Location:  GI      No family history on file.  He does not have a family history of prostate cancer.  Social History     Socioeconomic History     Marital status: Single   Tobacco Use     Smoking status: Former Smoker     Smokeless tobacco: Current User   Substance and Sexual Activity     Alcohol use: No     Drug use: Yes     Frequency: 0.5 times per week     Types: Marijuana        REVIEW OF SYSTEMS  =================  C: NEGATIVE for fever, chills, change in weight  I: NEGATIVE for worrisome rashes, moles or lesions  E/M: NEGATIVE for ear, mouth and throat problems  R: NEGATIVE for significant cough or SHORTNESS OF BREATH  CV:  NEGATIVE for chest pain, palpitations or peripheral edema  GI: NEGATIVE for nausea, abdominal pain, heartburn, or change in bowel habits  NEURO: NEGATIVE numbness/weakness  : see HPI  PSYCH: NEGATIVE depression/anxiety  LYmph: no new enlarged lymph nodes  Ortho: no new trauma/movements           O: Exam:BP (!) 164/91 (BP Location: Right arm, Patient Position: Sitting, Cuff Size: Adult Regular)   Pulse 85   Temp 97.9  F (36.6  C)  (Temporal)   Resp 16   Wt 77.1 kg (170 lb)   SpO2 96%   BMI 28.29 kg/m     Constitutional: healthy, alert and no distress  Cardiovascular: negative, PMI normal.   Respiratory: negative, no evidence of respiratory distress  Gastrointestinal: Abdomen soft, non-tender. BS normal. No masses, organomegaly  : penis no discharge.  Testis no masses.  No scrotal skin lesion.  Prostate apex only large.  Musculoskeletal: extremities normal- no gross deformities noted, gait normal and normal muscle tone  Skin: no suspicious lesions or rashes  Neurologic: Alert and oriented  Psychiatric: mentation appears normal. and affect normal/bright  Hematologic/Lymphatic/Immunologic: normal ant/post cervical, axillary, supraclavicular and inguinal nodes    Assessment/Plan:   (N40.1,  R33.8) Benign prostatic hyperplasia with urinary retention  (primary encounter diagnosis)  Comment: patient failed trial of void today  Plan: teach SIC            Start flomax/proscar           RTC in several weeks     (R97.20) Elevated prostate specific antigen (PSA)  Comment:    Plan: s/p biopsy.  No cancer found.  psa stable.      Recommendations:   Medical management versus surgical management versus office treatments, were discussed with patient at length. Pros and Cons discussed.

## 2023-04-26 ENCOUNTER — TELEPHONE (OUTPATIENT)
Dept: UROLOGY | Facility: CLINIC | Age: 68
End: 2023-04-26
Payer: MEDICARE

## 2023-04-26 NOTE — TELEPHONE ENCOUNTER
Appointment has been scheduled for 7/12/23.     Sharon Lewis RN   [facility]

## 2023-04-26 NOTE — TELEPHONE ENCOUNTER
[facility] Referral     [facility] Authorization Number: [id number]    Patient has been referred to [facility] Dr[facility] Miller [facility] the [facility] for [facility] with urinary retention. The urgency on the referral was written as routine. Please assist patient in scheduling.     A copy of the referral was sent to scanning and the original was placed in Dr. Miller's mailbox.     Sharon Lewis RN   [facility]

## 2023-06-17 ENCOUNTER — HEALTH MAINTENANCE LETTER (OUTPATIENT)
Age: 68
End: 2023-06-17

## 2023-07-12 ENCOUNTER — OFFICE VISIT (OUTPATIENT)
Dept: UROLOGY | Facility: CLINIC | Age: 68
End: 2023-07-12
Payer: COMMERCIAL

## 2023-07-12 VITALS
TEMPERATURE: 97.4 F | DIASTOLIC BLOOD PRESSURE: 76 MMHG | SYSTOLIC BLOOD PRESSURE: 136 MMHG | BODY MASS INDEX: 28.82 KG/M2 | WEIGHT: 173 LBS | HEIGHT: 65 IN

## 2023-07-12 DIAGNOSIS — N40.1 BENIGN PROSTATIC HYPERPLASIA WITH URINARY RETENTION: Primary | ICD-10-CM

## 2023-07-12 DIAGNOSIS — R33.8 BENIGN PROSTATIC HYPERPLASIA WITH URINARY RETENTION: Primary | ICD-10-CM

## 2023-07-12 DIAGNOSIS — R97.20 ELEVATED PROSTATE SPECIFIC ANTIGEN (PSA): ICD-10-CM

## 2023-07-12 PROCEDURE — 99214 OFFICE O/P EST MOD 30 MIN: CPT | Mod: 25 | Performed by: UROLOGY

## 2023-07-12 PROCEDURE — 51798 US URINE CAPACITY MEASURE: CPT | Performed by: UROLOGY

## 2023-07-12 RX ORDER — FINASTERIDE 5 MG/1
5 TABLET, FILM COATED ORAL DAILY
Qty: 90 TABLET | Refills: 3 | Status: SHIPPED | OUTPATIENT
Start: 2023-07-12 | End: 2023-08-21

## 2023-07-12 RX ORDER — TAMSULOSIN HYDROCHLORIDE 0.4 MG/1
0.4 CAPSULE ORAL EVERY EVENING
Qty: 90 CAPSULE | Refills: 3 | Status: SHIPPED | OUTPATIENT
Start: 2023-07-12 | End: 2023-08-22

## 2023-07-12 ASSESSMENT — PAIN SCALES - GENERAL: PAINLEVEL: NO PAIN (0)

## 2023-07-12 NOTE — PROGRESS NOTES
Chief Complaint   Patient presents with     Follow Up     Urinary Retention       Wolf Tarango Jr is a 68 year old male who presents today for follow up of   Chief Complaint   Patient presents with     Follow Up     Urinary Retention    f/u for benign prostatic hyperplasia with h/o urinary retention and elevated psa.  He is on both proscar/flomax.  He has no new urinary complaints.  Recent psa was 2.86.    Current Outpatient Medications   Medication Sig Dispense Refill     aspirin 325 MG tablet Take 325 mg by mouth daily       Ibuprofen (ADVIL PO)        nicotine polacrilex (NICORETTE) 2 MG gum Place 2 mg inside cheek as needed for smoking cessation       finasteride (PROSCAR) 5 MG tablet Take 1 tablet (5 mg) by mouth daily 90 tablet 3     tamsulosin (FLOMAX) 0.4 MG capsule Take 1 capsule (0.4 mg) by mouth every evening 90 capsule 3     No Known Allergies   No past medical history on file.  Past Surgical History:   Procedure Laterality Date     COLONOSCOPY N/A 5/24/2022    Procedure: COLONOSCOPY;  Surgeon: Wolf Schrader MD;  Location: [facility]     No family history on file.  Social History     Socioeconomic History     Marital status: Single     Spouse name: None     Number of children: None     Years of education: None     Highest education level: None   Tobacco Use     Smoking status: Former     Smokeless tobacco: Current   Substance and Sexual Activity     Alcohol use: No     Drug use: Yes     Frequency: 0.5 times per week     Types: Marijuana       REVIEW OF SYSTEMS  =================  C: NEGATIVE for fever, chills, change in weight  I: NEGATIVE for worrisome rashes, moles or lesions  E/M: NEGATIVE for ear, mouth and throat problems  R: NEGATIVE for significant cough or SHORTNESS OF BREATH  CV:  NEGATIVE for chest pain, palpitations or peripheral edema  GI: NEGATIVE for nausea, abdominal pain, heartburn, or change in bowel habits  NEURO: NEGATIVE numbness/weakness  : see HPI  PSYCH: NEGATIVE  "depression/anxiety  LYmph: no new enlarged lymph nodes  Ortho: no new trauma/movements    Physical Exam:  Blood pressure 136/76, temperature 97.4  F (36.3  C), temperature source Temporal, height 1.651 m (5' 5\"), weight 78.5 kg (173 lb).    GENERAL: healthy, alert and no distress  EYES: Eyes grossly normal to inspection, conjunctivae and sclerae normal  RESP: no audible wheeze, cough, or visible cyanosis.  No visible retractions or increased work of breathing.  Able to speak fully in complete sentences.  NEURO: Cranial nerves grossly intact, mentation intact and speech normal  PSYCH: mentation appears normal, affect normal/bright, judgement and insight intact, normal speech and appearance well-groomed    Assessment/Plan:   (N40.1,  R33.8) Benign prostatic hyperplasia with urinary retention  (primary encounter diagnosis)  Comment: PVR < 50 ml  Plan: tamsulosin (FLOMAX) 0.4 MG capsule, finasteride        (PROSCAR) 5 MG tablet             (R97.20) Elevated prostate specific antigen (PSA)  Comment:  stable  Plan: [facility]                    "

## 2023-08-21 DIAGNOSIS — R33.8 BENIGN PROSTATIC HYPERPLASIA WITH URINARY RETENTION: ICD-10-CM

## 2023-08-21 DIAGNOSIS — N40.1 BENIGN PROSTATIC HYPERPLASIA WITH URINARY RETENTION: ICD-10-CM

## 2023-08-21 RX ORDER — FINASTERIDE 5 MG/1
5 TABLET, FILM COATED ORAL DAILY
Qty: 90 TABLET | Refills: 3 | Status: SHIPPED | OUTPATIENT
Start: 2023-08-21

## 2023-08-21 NOTE — TELEPHONE ENCOUNTER
Refill prescription approved per [facility] Refill Protocol. Last OV= 07/2023.  Signed Prescriptions:                        Disp   Refills    finasteride (PROSCAR) 5 MG tablet          90 tab*3        Sig: Take 1 tablet (5 mg) by mouth daily  Authorizing Provider: BALJINDER DAVIES  Ordering User: DONTA CHAVIS RN Urology 8/21/2023 12:30 PM

## 2023-08-22 ENCOUNTER — TELEPHONE (OUTPATIENT)
Dept: UROLOGY | Facility: CLINIC | Age: 68
End: 2023-08-22
Payer: MEDICARE

## 2023-08-22 DIAGNOSIS — R33.8 BENIGN PROSTATIC HYPERPLASIA WITH URINARY RETENTION: ICD-10-CM

## 2023-08-22 DIAGNOSIS — N40.1 BENIGN PROSTATIC HYPERPLASIA WITH URINARY RETENTION: ICD-10-CM

## 2023-08-22 RX ORDER — TAMSULOSIN HYDROCHLORIDE 0.4 MG/1
0.4 CAPSULE ORAL EVERY EVENING
Qty: 90 CAPSULE | Refills: 3 | Status: SHIPPED | OUTPATIENT
Start: 2023-08-22

## 2023-08-22 NOTE — TELEPHONE ENCOUNTER
Signed Prescriptions:                        Disp   Refills    tamsulosin (FLOMAX) 0.4 MG capsule         90 cap*3        Sig: Take 1 capsule (0.4 mg) by mouth every evening  Authorizing Provider: BALJINDER DAVIES  Ordering User: DONTA CHAVIS    Refill prescription approved per [facility] Refill Protocol. Last OV= 07/2023.    Helena VIVAS RN Urology 8/22/2023 10:52 AM

## 2024-08-10 ENCOUNTER — HEALTH MAINTENANCE LETTER (OUTPATIENT)
Age: 69
End: 2024-08-10

## 2025-08-16 ENCOUNTER — HEALTH MAINTENANCE LETTER (OUTPATIENT)
Age: 70
End: 2025-08-16

## (undated) DEVICE — GLOVE EXAM NITRILE LG

## (undated) DEVICE — SOL WATER IRRIG 1000ML BOTTLE 07139-09

## (undated) DEVICE — LUBRICATING JELLY 4.25OZ

## (undated) DEVICE — TUBING SUCTION 12"X1/4" N612

## (undated) DEVICE — KIT ENDO TURNOVER/PROCEDURE CARRY-ON 101822